# Patient Record
Sex: FEMALE | Race: WHITE
[De-identification: names, ages, dates, MRNs, and addresses within clinical notes are randomized per-mention and may not be internally consistent; named-entity substitution may affect disease eponyms.]

---

## 2019-06-26 ENCOUNTER — HOSPITAL ENCOUNTER (EMERGENCY)
Dept: HOSPITAL 10 - FTE | Age: 29
Discharge: HOME | End: 2019-06-26
Payer: COMMERCIAL

## 2019-06-26 ENCOUNTER — HOSPITAL ENCOUNTER (EMERGENCY)
Dept: HOSPITAL 91 - FTE | Age: 29
Discharge: HOME | End: 2019-06-26
Payer: COMMERCIAL

## 2019-06-26 VITALS
WEIGHT: 92.59 LBS | BODY MASS INDEX: 18.18 KG/M2 | HEIGHT: 60 IN | WEIGHT: 92.59 LBS | HEIGHT: 60 IN | BODY MASS INDEX: 18.18 KG/M2

## 2019-06-26 VITALS — DIASTOLIC BLOOD PRESSURE: 68 MMHG | SYSTOLIC BLOOD PRESSURE: 112 MMHG | HEART RATE: 75 BPM | RESPIRATION RATE: 16 BRPM

## 2019-06-26 DIAGNOSIS — R51: Primary | ICD-10-CM

## 2019-06-26 PROCEDURE — 99284 EMERGENCY DEPT VISIT MOD MDM: CPT

## 2019-06-26 PROCEDURE — 81025 URINE PREGNANCY TEST: CPT

## 2019-06-26 PROCEDURE — 96372 THER/PROPH/DIAG INJ SC/IM: CPT

## 2019-06-26 RX ADMIN — ONDANSETRON 1 MG: 4 TABLET, ORALLY DISINTEGRATING ORAL at 20:35

## 2019-06-26 RX ADMIN — KETOROLAC TROMETHAMINE 1 MG: 30 INJECTION, SOLUTION INTRAMUSCULAR at 20:43

## 2019-06-26 RX ADMIN — DIPHENHYDRAMINE HYDROCHLORIDE 1 MG: 25 CAPSULE ORAL at 20:42

## 2019-06-27 NOTE — ERD
ER Documentation


Chief Complaint


Chief Complaint





top of head going to [place]-ocular hurts on/off x 6 mos, no recent trauma





HPI


28-year-old female presents for headache for 1 day.  Pain is localized to the 


forehead and behind the eyes.  She reports gradual onset.  She tried Advil with 


some relief.  Current pain is rated 5/10 in severity.  She has also had some 


nasal congestion.  She denies any neck pain, fevers, chills, dizziness, syncope,


or other symptoms at this time.  Overall symptoms have improved.





ROS


All systems reviewed and are negative except as per history of present illness.





Medications


Home Meds


Active Scripts


Ondansetron (Ondansetron Odt) 4 Mg Tab.rapdis, 4 MG PO Q6H PRN for NAUSEA AND/OR


VOMITING, #10 TAB


   Prov:JESSICA BELL PA-C         6/26/19


Naproxen* (Naprosyn*) 500 Mg Tablet, 500 MG PO BID PRN for PAIN AND/OR 


INFLAMMATION, #30 TAB


   Prov:JESSICA BELL PA-C         6/26/19





Allergies


Allergies:  


Coded Allergies:  


     No Known Allergy (Unverified , 6/26/19)





PMhx/Soc


History of Surgery:  Yes (Chin/mouth surgery)


Anesthesia Reaction:  No


Hx Neurological Disorder:  Yes (migarine HA)


Hx Respiratory Disorders:  No


Hx Cardiac Disorders:  No


Hx Psychiatric Problems:  No


Hx Miscellaneous Medical Probl:  No


Hx Alcohol Use:  No


Hx Substance Use:  No


Hx Tobacco Use:  No


Smoking Status:  Never smoker





FmHx


Family History:  No diabetes





Physical Exam


Vitals





Vital Signs


  Date      Temp  Pulse  Resp  B/P (MAP)   Pulse Ox  O2          O2 Flow    FiO2


Time                                                 Delivery    Rate


   6/26/19  98.1     75    16      112/68       100  Room Air


     21:31                           (83)


   6/26/19  98.2     87    18      114/59        96


     18:42                           (77)





Physical Exam


Const:   No acute distress


Head:   Atraumatic 


Eyes:    Normal Conjunctiva


ENT:    Normal External Ears, Nose and Mouth.


Neck:               Full range of motion. No meningismus.


Resp:   Clear to auscultation bilaterally


Cardio:   Regular rate and rhythm, no murmurs


Skin:   No petechiae or rashes


Back:   No midline or flank tenderness


Ext:    No cyanosis, or edema


 Neuro:


 M/S:      Alert and oriented


 Face:      EOMI, face and pharynx with normal sensation and function


 Motor:    Normal strength throughout


 Sensation:    Normal sensation throughout


 Speech:   Normal


 Cerebel:   Normal coordination


      Normal gait


      Normal finger to nose


Psych:    Normal Mood and Affect


Results 24 hrs





Laboratory Tests


                    Test
                      6/26/19
20:35


                    POC Beta HCG, Qualitative  NEGATIVE





Current Medications


 Medications
   Dose
          Sig/Eryn
       Start Time
   Status  Last


 (Trade)       Ordered        Route
 PRN     Stop Time              Admin
Dose


                              Reason                                Admin


 Ketorolac
     30 mg          ONCE  STAT
    6/26/19       DC           6/26/19


Tromethamine
                 IM
            20:25
                       20:43



 (Toradol)                                   6/26/19 20:26


                25 mg          ONCE  ONCE
    6/26/19       DC           6/26/19


Diphenhydrami                 PO
            20:30
                       20:42



ne
 HCl
                                     6/26/19 20:31


(Benadryl)


 Ondansetron    4 mg           ONCE  STAT
    6/26/19       DC           6/26/19


HCl
  (Zofran                 ODT
           20:25
                       20:35



Odt)                                         6/26/19 20:26








Procedures/MDM


28-year-old female presents to the emergency department complaining of headache.


 She had no meningeal signs.  Vital signs are stable and she was afebrile and 


nontoxic and well-appearing.  She is administered Toradol, Benadryl, Zofran with


significant improvement of her symptoms.  On reevaluation her pain was markedly 


decreased.  Her neurological examination was benign and I doubt intracranial 


hemorrhage, mass, CVA, TIA, or other emergent conditions.  The risks of CT head 


radiation outweighed the benefits at this time and the patient can follow-up 


with a neurologist as an outpatient for MRI.  Patient will be discharged home in


stable condition and was advised to return to the department immediately for any


new or worsening or concerning symptoms.  She understands and agrees with the 


plan.





Departure


Diagnosis:  


   Primary Impression:  


   Headache


Condition:  Fair


Patient Instructions:  Self-Care for Headaches


Referrals:  


[facility]


YOU HAVE RECEIVED A MEDICAL SCREENING EXAM AND THE RESULTS INDICATE THAT YOU DO 


NOT HAVE A CONDITION THAT REQUIRES URGENT TREATMENT IN THE EMERGENCY DEPARTMENT.





FURTHER EVALUATION AND TREATMENT OF YOUR CONDITION CAN WAIT UNTIL YOU ARE SEEN 


IN YOUR DOCTORS OFFICE WITHIN THE NEXT 1-2 DAYS. IT IS YOUR RESPONSIBILITY TO 


MAKE AN APPOINTMENT FOR FOLOW-UP CARE.





IF YOU HAVE A PRIMARY DOCTOR


--you should call your primary doctor and schedule an appointment





IF YOU DO NOT HAVE A PRIMARY DOCTOR YOU CAN CALL OUR PHYSICIAN REFERRAL HOTLINE 


AT


 [phone] 





IF YOU CAN NOT AFFORD TO SEE A PHYSICIAN YOU CAN CHOSE FROM THE FOLLOWING 


[facility] CLINICS





[facility] [phone] [address] [facility] [address] [facility]. [facility] [phone] [address] [facility]. [facility] [phone] [facility]. [facility] [phone] [address] [facility]. [facility] [phone] [address] [facility]. [facility] [phone] [address]





Additional Instructions:  


Call your primary care doctor TOMORROW for an appointment during the next 1-2 


days.See the doctor sooner or return here if your condition worsens before your 


appointment time.











JESSICA BELL PA-C       Jun 27, 2019 01:13